# Patient Record
Sex: FEMALE | Race: WHITE
[De-identification: names, ages, dates, MRNs, and addresses within clinical notes are randomized per-mention and may not be internally consistent; named-entity substitution may affect disease eponyms.]

---

## 2018-05-09 ENCOUNTER — HOSPITAL ENCOUNTER (OUTPATIENT)
Dept: HOSPITAL 53 - M ADMPAT | Age: 73
End: 2018-05-09
Attending: ORTHOPAEDIC SURGERY
Payer: COMMERCIAL

## 2018-05-09 DIAGNOSIS — M17.12: Primary | ICD-10-CM

## 2018-05-09 LAB
ALBUMIN/GLOBULIN RATIO: 1.2 (ref 1–1.93)
ALBUMIN: 3.6 GM/DL (ref 3.2–5.2)
ALKALINE PHOSPHATASE: 74 U/L (ref 45–117)
ALT SERPL W P-5'-P-CCNC: 39 U/L (ref 12–78)
ANION GAP: 3 MEQ/L (ref 8–16)
AST SERPL-CCNC: 30 U/L (ref 7–37)
BILIRUBIN,TOTAL: 0.5 MG/DL (ref 0.2–1)
BLOOD UREA NITROGEN: 25 MG/DL (ref 7–18)
CALCIUM LEVEL: 9.6 MG/DL (ref 8.8–10.2)
CARBON DIOXIDE LEVEL: 32 MEQ/L (ref 21–32)
CHLORIDE LEVEL: 105 MEQ/L (ref 98–107)
CREATININE FOR GFR: 0.7 MG/DL (ref 0.55–1.3)
ERYTHROCYTE SEDIMENTATION RATE: 12 MM/HR (ref 0–30)
GFR SERPL CREATININE-BSD FRML MDRD: > 60 ML/MIN/{1.73_M2} (ref 39–?)
GLUCOSE, FASTING: 89 MG/DL (ref 70–100)
HEMATOCRIT: 41.3 % (ref 36–47)
HEMOGLOBIN: 13.8 G/DL (ref 12–15.5)
INR: 0.91
MEAN CORPUSCULAR HEMOGLOBIN: 31.4 PG (ref 27–33)
MEAN CORPUSCULAR HGB CONC: 33.4 G/DL (ref 32–36.5)
MEAN CORPUSCULAR VOLUME: 93.9 FL (ref 80–96)
NRBC BLD AUTO-RTO: 0 % (ref 0–0)
PLATELET COUNT, AUTOMATED: 306 10^3/UL (ref 150–450)
POTASSIUM SERUM: 4.4 MEQ/L (ref 3.5–5.1)
PROTHROMBIN TIME: 12.3 SECONDS (ref 12.4–14.5)
RED BLOOD COUNT: 4.4 10^6/UL (ref 4–5.4)
RED CELL DISTRIBUTION WIDTH: 12.8 % (ref 11.5–14.5)
SODIUM LEVEL: 140 MEQ/L (ref 136–145)
TOTAL PROTEIN: 6.6 GM/DL (ref 6.4–8.2)
WHITE BLOOD COUNT: 8.9 10^3/UL (ref 4–10)

## 2018-05-09 PROCEDURE — 71046 X-RAY EXAM CHEST 2 VIEWS: CPT

## 2018-05-22 ENCOUNTER — HOSPITAL ENCOUNTER (INPATIENT)
Dept: HOSPITAL 53 - M OR | Age: 73
LOS: 2 days | Discharge: HOME HEALTH | DRG: 302 | End: 2018-05-24
Attending: ORTHOPAEDIC SURGERY | Admitting: ORTHOPAEDIC SURGERY
Payer: COMMERCIAL

## 2018-05-22 DIAGNOSIS — Z79.899: ICD-10-CM

## 2018-05-22 DIAGNOSIS — M10.9: ICD-10-CM

## 2018-05-22 DIAGNOSIS — E78.5: ICD-10-CM

## 2018-05-22 DIAGNOSIS — I10: ICD-10-CM

## 2018-05-22 DIAGNOSIS — M17.12: Primary | ICD-10-CM

## 2018-05-22 DIAGNOSIS — Z98.42: ICD-10-CM

## 2018-05-22 DIAGNOSIS — Z79.82: ICD-10-CM

## 2018-05-22 PROCEDURE — 0SRD0J9 REPLACEMENT OF LEFT KNEE JOINT WITH SYNTHETIC SUBSTITUTE, CEMENTED, OPEN APPROACH: ICD-10-PCS

## 2018-05-22 RX ADMIN — DEXTROSE MONOHYDRATE 1 MG: 50 INJECTION, SOLUTION INTRAVENOUS at 08:55

## 2018-05-22 RX ADMIN — MIDAZOLAM 1 MG: 1 INJECTION INTRAMUSCULAR; INTRAVENOUS at 07:29

## 2018-05-22 RX ADMIN — BUPIVACAINE HYDROCHLORIDE AND EPINEPHRINE BITARTRATE 1 ML: 5; .005 INJECTION, SOLUTION EPIDURAL; INTRACAUDAL; PERINEURAL at 08:28

## 2018-05-22 RX ADMIN — DEXTROSE MONOHYDRATE 1 MG: 50 INJECTION, SOLUTION INTRAVENOUS at 08:54

## 2018-05-22 RX ADMIN — CEFAZOLIN SODIUM 1 GM: 1 INJECTION, POWDER, FOR SOLUTION INTRAMUSCULAR; INTRAVENOUS at 08:53

## 2018-05-22 RX ADMIN — SODIUM CHLORIDE, POTASSIUM CHLORIDE, SODIUM LACTATE AND CALCIUM CHLORIDE 1 MLS/HR: 600; 310; 30; 20 INJECTION, SOLUTION INTRAVENOUS at 20:06

## 2018-05-22 RX ADMIN — PREGABALIN 1 MG: 75 CAPSULE ORAL at 06:37

## 2018-05-22 RX ADMIN — MIDAZOLAM 1 MG: 1 INJECTION INTRAMUSCULAR; INTRAVENOUS at 07:27

## 2018-05-22 RX ADMIN — FENTANYL CITRATE 1 MCG: 50 INJECTION, SOLUTION INTRAMUSCULAR; INTRAVENOUS at 07:27

## 2018-05-22 RX ADMIN — SODIUM CHLORIDE, POTASSIUM CHLORIDE, SODIUM LACTATE AND CALCIUM CHLORIDE 1 MLS/HR: 600; 310; 30; 20 INJECTION, SOLUTION INTRAVENOUS at 10:45

## 2018-05-22 RX ADMIN — BUPIVACAINE HYDROCHLORIDE 1 ML: 2.5 INJECTION, SOLUTION EPIDURAL; INFILTRATION; INTRACAUDAL at 09:30

## 2018-05-22 RX ADMIN — BUPIVACAINE 1 ML: 13.3 INJECTION, SUSPENSION, LIPOSOMAL INFILTRATION at 09:30

## 2018-05-22 RX ADMIN — CELECOXIB 1 MG: 400 CAPSULE ORAL at 06:37

## 2018-05-23 LAB
ALBUMIN/GLOBULIN RATIO: 1.13 (ref 1–1.93)
ALBUMIN: 2.6 GM/DL (ref 3.2–5.2)
ALKALINE PHOSPHATASE: 51 U/L (ref 45–117)
ALT SERPL W P-5'-P-CCNC: 32 U/L (ref 12–78)
ANION GAP: 6 MEQ/L (ref 8–16)
AST SERPL-CCNC: 26 U/L (ref 7–37)
BILIRUBIN,TOTAL: 0.5 MG/DL (ref 0.2–1)
BLOOD UREA NITROGEN: 23 MG/DL (ref 7–18)
CALCIUM LEVEL: 8 MG/DL (ref 8.8–10.2)
CARBON DIOXIDE LEVEL: 28 MEQ/L (ref 21–32)
CHLORIDE LEVEL: 108 MEQ/L (ref 98–107)
CREATININE FOR GFR: 0.71 MG/DL (ref 0.55–1.3)
GFR SERPL CREATININE-BSD FRML MDRD: > 60 ML/MIN/{1.73_M2} (ref 39–?)
GLUCOSE, FASTING: 128 MG/DL (ref 70–100)
HEMATOCRIT: 32.5 % (ref 36–47)
HEMOGLOBIN: 10.7 G/DL (ref 12–15.5)
INR: 1.18
MEAN CORPUSCULAR HEMOGLOBIN: 31.1 PG (ref 27–33)
MEAN CORPUSCULAR HGB CONC: 32.9 G/DL (ref 32–36.5)
MEAN CORPUSCULAR VOLUME: 94.5 FL (ref 80–96)
NRBC BLD AUTO-RTO: 0 % (ref 0–0)
PLATELET COUNT, AUTOMATED: 210 10^3/UL (ref 150–450)
POTASSIUM SERUM: 4.4 MEQ/L (ref 3.5–5.1)
PROTHROMBIN TIME: 15.2 SECONDS (ref 12.4–14.5)
RED BLOOD COUNT: 3.44 10^6/UL (ref 4–5.4)
RED CELL DISTRIBUTION WIDTH: 12.8 % (ref 11.5–14.5)
SODIUM LEVEL: 142 MEQ/L (ref 136–145)
TOTAL PROTEIN: 4.9 GM/DL (ref 6.4–8.2)
WHITE BLOOD COUNT: 12.6 10^3/UL (ref 4–10)

## 2018-05-23 RX ADMIN — SODIUM CHLORIDE, POTASSIUM CHLORIDE, SODIUM LACTATE AND CALCIUM CHLORIDE 1 MLS/HR: 600; 310; 30; 20 INJECTION, SOLUTION INTRAVENOUS at 04:44

## 2018-05-23 RX ADMIN — DOCUSATE SODIUM,SENNOSIDES 1 TAB: 50; 8.6 TABLET, FILM COATED ORAL at 09:05

## 2018-05-23 RX ADMIN — LISINOPRIL 1 MG: 10 TABLET ORAL at 09:04

## 2018-05-23 RX ADMIN — MULTIPLE VITAMINS W/ MINERALS TAB 1 TAB: TAB at 09:04

## 2018-05-23 RX ADMIN — CELECOXIB 1 MG: 100 CAPSULE ORAL at 09:04

## 2018-05-23 RX ADMIN — ASPIRIN 1 MG: 81 TABLET ORAL at 09:04

## 2018-05-23 RX ADMIN — DOCUSATE SODIUM,SENNOSIDES 1 TAB: 50; 8.6 TABLET, FILM COATED ORAL at 21:17

## 2018-05-23 RX ADMIN — MAGNESIUM HYDROXIDE 1 ML: 400 SUSPENSION ORAL at 09:04

## 2018-05-23 RX ADMIN — Medication 1 UNITS: at 09:04

## 2018-05-23 RX ADMIN — Medication 1 EA: at 09:05

## 2018-05-23 RX ADMIN — RIVAROXABAN 1 MG: 10 TABLET, FILM COATED ORAL at 17:18

## 2018-05-23 RX ADMIN — POLYETHYLENE GLYCOL 3350 1 PKT: 17 POWDER, FOR SOLUTION ORAL at 09:00

## 2018-05-24 LAB
INR: 1.3
PROTHROMBIN TIME: 16.5 SECONDS (ref 12.4–14.5)

## 2018-05-24 RX ADMIN — MORPHINE SULFATE 1 MG: 15 TABLET, EXTENDED RELEASE ORAL at 11:14

## 2018-05-24 RX ADMIN — ASPIRIN 1 MG: 81 TABLET ORAL at 08:58

## 2018-05-24 RX ADMIN — MULTIPLE VITAMINS W/ MINERALS TAB 1 TAB: TAB at 09:02

## 2018-05-24 RX ADMIN — CELECOXIB 1 MG: 100 CAPSULE ORAL at 08:58

## 2018-05-24 RX ADMIN — Medication 1 UNITS: at 09:01

## 2018-05-24 RX ADMIN — LISINOPRIL 1 MG: 10 TABLET ORAL at 09:01

## 2018-05-24 RX ADMIN — DOCUSATE SODIUM,SENNOSIDES 1 TAB: 50; 8.6 TABLET, FILM COATED ORAL at 09:00

## 2018-05-24 RX ADMIN — MAGNESIUM HYDROXIDE 1 ML: 400 SUSPENSION ORAL at 09:02

## 2018-05-24 RX ADMIN — Medication 1 EA: at 09:00

## 2018-05-24 RX ADMIN — POLYETHYLENE GLYCOL 3350 1 PKT: 17 POWDER, FOR SOLUTION ORAL at 09:02

## 2018-08-08 ENCOUNTER — HOSPITAL ENCOUNTER (OUTPATIENT)
Dept: HOSPITAL 53 - M WHC | Age: 73
End: 2018-08-08
Attending: INTERNAL MEDICINE
Payer: COMMERCIAL

## 2018-08-08 DIAGNOSIS — M81.8: Primary | ICD-10-CM

## 2018-08-08 DIAGNOSIS — Z12.31: ICD-10-CM

## 2018-08-08 PROCEDURE — 77067 SCR MAMMO BI INCL CAD: CPT

## 2018-12-04 ENCOUNTER — HOSPITAL ENCOUNTER (OUTPATIENT)
Dept: HOSPITAL 53 - M LAB | Age: 73
End: 2018-12-04
Attending: INTERNAL MEDICINE
Payer: COMMERCIAL

## 2018-12-04 DIAGNOSIS — M81.0: Primary | ICD-10-CM

## 2018-12-04 LAB — CALCIUM LEVEL: 9.1 MG/DL (ref 8.8–10.2)

## 2018-12-04 PROCEDURE — 82310 ASSAY OF CALCIUM: CPT

## 2019-06-03 ENCOUNTER — HOSPITAL ENCOUNTER (OUTPATIENT)
Dept: HOSPITAL 53 - M LABDRAW1 | Age: 74
End: 2019-06-03
Attending: INTERNAL MEDICINE
Payer: COMMERCIAL

## 2019-06-03 DIAGNOSIS — M81.0: Primary | ICD-10-CM

## 2019-10-25 ENCOUNTER — HOSPITAL ENCOUNTER (OUTPATIENT)
Dept: HOSPITAL 53 - M WHC | Age: 74
End: 2019-10-25
Attending: INTERNAL MEDICINE
Payer: COMMERCIAL

## 2019-10-25 DIAGNOSIS — Z12.31: Primary | ICD-10-CM

## 2019-10-25 NOTE — REPMRS
Patient History

The patient states she had a clinical breast exam in  9/2019.

No known family history of cancer.

 

3D TOMOSYNTHESIS WAS PERFORMED.

 

The [facility] lifetime risk for breast cancer is 4.6%.

 

Digital Woman Screen Mammo: October 25, 2019 - Exam #: 

[id number]

Bilateral CC and MLO view(s) were taken.

 

Technologist: Gisel Ortiz, Technologist

Prior study comparison: August 8, 2018, bilateral digital woman 

screen mammo performed at [facility].  May 

10, 2017, digital woman screen mammo performed at [facility]

[facility].

 

FINDINGS: The breast tissue is heterogeneously dense.  This may 

lower the sensitivity of mammography.  There has been no change 

in the appearance of the mammogram from the prior studies.  There

is a moderate amount of residual fibroglandular tissue which is 

fairly symmetric. There is no interval development of dominant 

mass, areas of architectural distortion, or clustered 

microcalcification typical of malignancy.

 

Assessment: BI-RADS/ACR category 1 mammogram. Negative Mammogram.

 

Recommendation

Routine screening mammogram in 1 year (for women over age 40).

This mammogram was interpreted with the aid of an FDA-approved 

computer-aided dectection system.

 

Electronically Signed By: Blane Landeros MD 10/25/19 1007

## 2019-12-03 ENCOUNTER — HOSPITAL ENCOUNTER (OUTPATIENT)
Dept: HOSPITAL 53 - M LABDRAW1 | Age: 74
End: 2019-12-03
Attending: INTERNAL MEDICINE
Payer: COMMERCIAL

## 2019-12-03 DIAGNOSIS — M81.0: Primary | ICD-10-CM

## 2019-12-03 LAB
25(OH)D3 SERPL-MCNC: 36.8 NG/ML (ref 30–100)
CALCIUM SERPL-MCNC: 10.2 MG/DL (ref 8.8–10.2)

## 2019-12-17 ENCOUNTER — HOSPITAL ENCOUNTER (OUTPATIENT)
Dept: HOSPITAL 53 - M SDC | Age: 74
Discharge: HOME | End: 2019-12-17
Payer: COMMERCIAL

## 2019-12-17 VITALS — WEIGHT: 186 LBS | BODY MASS INDEX: 30.99 KG/M2 | HEIGHT: 65 IN

## 2019-12-17 VITALS — SYSTOLIC BLOOD PRESSURE: 133 MMHG | DIASTOLIC BLOOD PRESSURE: 63 MMHG

## 2019-12-17 DIAGNOSIS — Z79.899: ICD-10-CM
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
## 2019-12-17 DIAGNOSIS — I10: ICD-10-CM
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
## 2019-12-17 DIAGNOSIS — S52.571A: Primary | ICD-10-CM

## 2019-12-17 PROCEDURE — 25608 OPTX DST RD XART FX/EPI SEP2: CPT

## 2019-12-17 PROCEDURE — 76000 FLUOROSCOPY <1 HR PHYS/QHP: CPT

## 2019-12-17 NOTE — REP
22 spot fluoroscopic images:  12/17/2019.

 

Indication:  Intraoperative guidance.

 

Findings:

 

22 spot fluoroscopic images were obtained for intraoperative guidance.  Please

see operative report for details.

 

Impression:

 

Fluoroscopy provided for operative guidance.

 

39 seconds of fluoroscopy time.

 

 

 

 

Electronically Signed by

Anthony Suh DO 12/17/2019 04:07 P

## 2019-12-18 NOTE — RO
DATE OF PROCEDURE:  12/17/2019

 

PREOPERATIVE DIAGNOSIS:  Right comminuted intra-articular distal radius

fracture.

 

POSTOPERATIVE DIAGNOSIS:  Right comminuted intra-articular distal radius

fracture.

 

PROCEDURE:  Right open reduction, internal fixation of distal radius fracture,

modifier 22 from two prior surgeries in the operative plane.

 

SURGEON:  Sam Mabry MD

 

ASSISTANT:  None.

 

ANESTHESIA:  General.

 

INDICATIONS:  74-year-old female who had a fall this morning, suffered

intra-articular distal radius fracture that was distally displaced and

comminuted.  We discussed non versus operative intervention.  Patient elected to

proceed with operative intervention, especially considering she already had 
prior

serratus surgery before provided by Dr. Diez in the past.  She had the

original surgery along with removal of hardware.  She understood the risks and

benefits of surgery including, but not limited to, infection, damage to

surrounding structures, malunion, nonunion, incomplete relief.  Patient

understood these and wished to proceed.

 

PREOPERATIVE ANTIBIOTICS:  2 grams of Ancef.

 

COMPLICATIONS:  None.

 

TOURNIQUET TIME:  36 minutes.

 

BLOOD LOSS:  Minimal.

 

OPERATIVE DESCRIPTION:  Patient was brought back to the operating room (OR) in

supine position, underwent general anesthesia, at which point the right arm was

prepped and draped in the usual fashion.  We did a time-out confirming site,

side, and surgery.  Elevated tourniquet up once all in agreement to 250 mmHg.

 

We then utilized her old incision with a slight extension distally 1 cm along 
the

flexor carpi radialis (FCR) tendon.  We identified the FCR tendon.  There was

significant scar tissue through here surrounding FCR tendon and the radial

artery.  I presumed the previous surgeon had used the traditional Laron approach

between FCR and the radial artery during the two prior surgeries.  Therefore,

careful dissection was taken to ensure that the radial artery along with

superficial sensory branch of radial nerve is not injured, and this why we had

added the additional modifier 22 along with the site malunion that had occurred;

that made the reduction a little more difficult to assess.

 

At this point, the FCR was then retracted ulnarly.  We encountered flexor

pollicis longus (FPL).  This was retracted ulnarly as well, exposing pronator

quadratus.  We lifted the pronator quadratus off the bone, exposing the fracture

site.  This was irrigated and debrided sharply with elevator Shelton and #15

blade.

 

At this point, a closed reduction technique was performed to reduce the 
fracture.

We confirmed on AP and lateral.  We were happy with this reduction.  We then 
used

the three shaft hole variable angle distal radius Synthes plate and secured it

distally with a RayTec underneath the most proximal aspect of the shaft to help

restore the volar tilt.  We confirmed screw placement, four screws distally in

the locking holes on both AP and lateral obliques and inclination in order to

assess reduction along with no screws penetrating the articular surface.  We 
were

happy with this.

 

We then secured the plate to the shaft using 2.7 cortical

screws.  We confirmed on AP and lateral x-rays adequate reduction of the 
fracture

along with fixation.  Irrigated the wound thoroughly.  Closed with #3-0 Vicryl

and #3-0 nylon.  Dressed with Adaptic, gauze, Kerlix, and placed dorsal based

splints for the distal radius.  Tourniquet was deflated, and patient was 
awakened

and taken to the postanesthesia care unit (PACU) in stable condition.

 

POSTOPERATIVE PLAN:  Patient will work on finger range of motion and pain

control.  We will see her in the office in approximately 2 weeks for a repeat

clinical check and suture removal and convert her over to removable brace at 
that

time.  Patient expressed understanding and agreement with this ahead of time.

SYLVIA

## 2020-06-01 ENCOUNTER — HOSPITAL ENCOUNTER (OUTPATIENT)
Dept: HOSPITAL 53 - M LAB | Age: 75
End: 2020-06-01
Attending: INTERNAL MEDICINE
Payer: COMMERCIAL

## 2020-06-01 DIAGNOSIS — M81.0: Primary | ICD-10-CM

## 2020-08-10 ENCOUNTER — HOSPITAL ENCOUNTER (OUTPATIENT)
Dept: HOSPITAL 53 - M WHC | Age: 75
End: 2020-08-10
Attending: INTERNAL MEDICINE
Payer: COMMERCIAL

## 2020-08-10 DIAGNOSIS — M85.851: ICD-10-CM

## 2020-08-10 DIAGNOSIS — Z12.31: Primary | ICD-10-CM

## 2020-08-10 DIAGNOSIS — M81.0: Primary | ICD-10-CM

## 2020-08-27 NOTE — REPMRS
Patient History

The patient states she had a clinical breast exam in June 2020.

Patient is postmenopausal.

No known family history of cancer.

 

Digital Woman Screen Mammo: August 10, 2020 - Exam #: 

[id number]

Bilateral CC and MLO view(s) were taken.

 

Technologist: Radha Johnson, Technologist

Prior study comparison: October 25, 2019, bilateral digital woman

screen mammo performed at [facility].  August 8, 2018, bilateral digital woman screen 

mammo performed at [facility].  May 10, 2017, digital woman screen mammo performed at 

[facility].

 

FINDINGS: There are scattered fibroglandular densities.  The 

Volpara volumetric breast density category is:B.  There has been 

no change in the appearance of the mammogram from the prior 

studies.  There is a mild amount of scattered fibroglandular 

density which is fairly symmetric. There is no interval 

development of dominant mass, architectural distortion, or 

grouped microcalcification suggestive of malignancy.

3-D tomosynthesis shows no additional findings.

 

Assessment: BI-RADS/ACR category 1 mammogram. Negative Mammogram.

 

Recommendation

Routine screening mammogram of both breasts in 1 year (for women 

over age 40).

This patient's Lifetime Breast Cancer Risk is estimated at 4.6 %.

This mammogram was interpreted with the aid of an FDA-approved 

computer-aided dectection system.

 

Electronically Signed By: Flaco Youngblood MD 08/27/20 9747

## 2020-09-01 NOTE — DEXA
AP SPINE   L1 - L4   1.279       0.7           2.3      

LT FEMUR   TOTAL   0.684      -2.6          -1.5               

LT NECK      0.765      -2.0          -0.7               

RT FEMUR   TOTAL   0.687      -2.5          -1.5      

RT NECK      0.808      -1.7          -0.4

TOTAL BODY   TOTAL            

OTHER



COMMENTS:

Normal Densitometry of the spine and hips.

There is low bone density of the right hip.

There is Osteoporosis of the left hip.

Lumbar scoliosis.

The density of the spine has increased 6.3% since the initial exam on 
08/13/2003.

The increased 2.2% since the most recent exam on 06/30/2016.

The density of the left hip has decreased 19.0% since the initial exam on 
08/13/2003.

The density of the left hip has decreased 5.0% since the most recent exam on 
06/30/2016.

The density of the right hip has decreased 17.8% since the initial exam on 
08/13/2003

The density of the right hip has decreased 7.3% since the most recent exam on 
06/30/2016.



FOLLOW-UP:

Recommendation for the next bone density exam: 2 years.


SYLVIA

## 2020-12-11 ENCOUNTER — HOSPITAL ENCOUNTER (OUTPATIENT)
Dept: HOSPITAL 53 - M LAB | Age: 75
End: 2020-12-11
Attending: INTERNAL MEDICINE
Payer: MEDICARE

## 2020-12-11 DIAGNOSIS — M81.0: Primary | ICD-10-CM

## 2020-12-11 DIAGNOSIS — E55.9: ICD-10-CM

## 2020-12-11 LAB
25(OH)D3 SERPL-MCNC: 34.7 NG/ML (ref 30–100)
CALCIUM SERPL-MCNC: 9.9 MG/DL (ref 8.8–10.2)

## 2021-04-18 ENCOUNTER — HOSPITAL ENCOUNTER (EMERGENCY)
Dept: HOSPITAL 53 - M ED | Age: 76
Discharge: ANOTHER HOSPITAL | End: 2021-04-18
Payer: MEDICARE

## 2021-04-18 VITALS — HEIGHT: 65 IN | BODY MASS INDEX: 33.06 KG/M2 | WEIGHT: 198.42 LBS

## 2021-04-18 VITALS — SYSTOLIC BLOOD PRESSURE: 140 MMHG | DIASTOLIC BLOOD PRESSURE: 70 MMHG

## 2021-04-18 VITALS — SYSTOLIC BLOOD PRESSURE: 142 MMHG | DIASTOLIC BLOOD PRESSURE: 74 MMHG

## 2021-04-18 DIAGNOSIS — I10: ICD-10-CM

## 2021-04-18 DIAGNOSIS — E78.5: ICD-10-CM

## 2021-04-18 DIAGNOSIS — I21.19: Primary | ICD-10-CM

## 2021-04-18 DIAGNOSIS — Z79.899: ICD-10-CM

## 2021-04-18 LAB
ALBUMIN SERPL BCG-MCNC: 3.6 GM/DL (ref 3.2–5.2)
ALT SERPL W P-5'-P-CCNC: 48 U/L (ref 12–78)
APTT BLD: 24.8 SECONDS (ref 24.2–38.5)
BASOPHILS # BLD AUTO: 0.1 10^3/UL (ref 0–0.2)
BASOPHILS NFR BLD AUTO: 0.7 % (ref 0–1)
BILIRUB CONJ SERPL-MCNC: 0.1 MG/DL (ref 0–0.2)
BILIRUB SERPL-MCNC: 0.5 MG/DL (ref 0.2–1)
BUN SERPL-MCNC: 30 MG/DL (ref 7–18)
CALCIUM SERPL-MCNC: 9.7 MG/DL (ref 8.8–10.2)
CHLORIDE SERPL-SCNC: 102 MEQ/L (ref 98–107)
CK MB CFR.DF SERPL CALC: 13.47
CK MB SERPL-MCNC: 76.4 NG/ML (ref ?–3.6)
CK SERPL-CCNC: 567 U/L (ref 26–192)
CO2 SERPL-SCNC: 28 MEQ/L (ref 21–32)
CREAT SERPL-MCNC: 0.8 MG/DL (ref 0.55–1.3)
EOSINOPHIL # BLD AUTO: 0.4 10^3/UL (ref 0–0.5)
EOSINOPHIL NFR BLD AUTO: 2.9 % (ref 0–3)
GFR SERPL CREATININE-BSD FRML MDRD: > 60 ML/MIN/{1.73_M2} (ref 39–?)
GLUCOSE SERPL-MCNC: 125 MG/DL (ref 70–100)
HCT VFR BLD AUTO: 42.1 % (ref 36–47)
HGB BLD-MCNC: 14.4 G/DL (ref 12–15.5)
INR PPP: 0.95
LIPASE SERPL-CCNC: 139 U/L (ref 73–393)
LYMPHOCYTES # BLD AUTO: 2.5 10^3/UL (ref 1.5–5)
LYMPHOCYTES NFR BLD AUTO: 18.4 % (ref 24–44)
MCH RBC QN AUTO: 31.8 PG (ref 27–33)
MCHC RBC AUTO-ENTMCNC: 34.2 G/DL (ref 32–36.5)
MCV RBC AUTO: 92.9 FL (ref 80–96)
MONOCYTES # BLD AUTO: 1.1 10^3/UL (ref 0–0.8)
MONOCYTES NFR BLD AUTO: 8.3 % (ref 2–8)
NEUTROPHILS # BLD AUTO: 9.4 10^3/UL (ref 1.5–8.5)
NEUTROPHILS NFR BLD AUTO: 68.8 % (ref 36–66)
PLATELET # BLD AUTO: 387 10^3/UL (ref 150–450)
POTASSIUM SERPL-SCNC: 4.2 MEQ/L (ref 3.5–5.1)
PROT SERPL-MCNC: 7.1 GM/DL (ref 6.4–8.2)
PROTHROMBIN TIME: 12.9 SECONDS (ref 12.5–14.3)
RBC # BLD AUTO: 4.53 10^6/UL (ref 4–5.4)
SODIUM SERPL-SCNC: 138 MEQ/L (ref 136–145)
TROPONIN I SERPL-MCNC: 7.05 NG/ML (ref ?–0.1)
WBC # BLD AUTO: 13.7 10^3/UL (ref 4–10)

## 2021-04-18 PROCEDURE — 85025 COMPLETE CBC W/AUTO DIFF WBC: CPT

## 2021-04-18 PROCEDURE — 71045 X-RAY EXAM CHEST 1 VIEW: CPT

## 2021-04-18 PROCEDURE — 83690 ASSAY OF LIPASE: CPT

## 2021-04-18 PROCEDURE — 84484 ASSAY OF TROPONIN QUANT: CPT

## 2021-04-18 PROCEDURE — 96374 THER/PROPH/DIAG INJ IV PUSH: CPT

## 2021-04-18 PROCEDURE — 93041 RHYTHM ECG TRACING: CPT

## 2021-04-18 PROCEDURE — 80048 BASIC METABOLIC PNL TOTAL CA: CPT

## 2021-04-18 PROCEDURE — 82553 CREATINE MB FRACTION: CPT

## 2021-04-18 PROCEDURE — 85610 PROTHROMBIN TIME: CPT

## 2021-04-18 PROCEDURE — 85730 THROMBOPLASTIN TIME PARTIAL: CPT

## 2021-04-18 PROCEDURE — 96375 TX/PRO/DX INJ NEW DRUG ADDON: CPT

## 2021-04-18 PROCEDURE — 87798 DETECT AGENT NOS DNA AMP: CPT

## 2021-04-18 PROCEDURE — 93005 ELECTROCARDIOGRAM TRACING: CPT

## 2021-04-18 PROCEDURE — 80076 HEPATIC FUNCTION PANEL: CPT

## 2021-04-18 PROCEDURE — 82550 ASSAY OF CK (CPK): CPT

## 2021-04-18 PROCEDURE — 99285 EMERGENCY DEPT VISIT HI MDM: CPT

## 2021-04-18 PROCEDURE — 96361 HYDRATE IV INFUSION ADD-ON: CPT

## 2021-04-18 PROCEDURE — 94760 N-INVAS EAR/PLS OXIMETRY 1: CPT

## 2021-04-18 RX ADMIN — NITROGLYCERIN PRN MG: 0.4 TABLET SUBLINGUAL at 03:01

## 2021-04-18 RX ADMIN — NITROGLYCERIN PRN MG: 0.4 TABLET SUBLINGUAL at 02:45

## 2021-04-18 RX ADMIN — NITROGLYCERIN PRN MG: 0.4 TABLET SUBLINGUAL at 02:53

## 2021-04-18 NOTE — ECGEPIP
[facility] - ED

                                       

                                       Test Date:    2021

Pat Name:     IVANNA MCGOWAN         Department:   

Patient ID:   [id number]                 Room:         -

Gender:       Female                   Technician:   BARON WATTERS

:          1945               Requested By: KRISTI KEEN

Order Number: [id number]     Reading MD:   Ludin Morgan

                                 Measurements

Intervals                              Axis          

Rate:         83                       P:            34

AK:           182                      QRS:          44

QRSD:         138                      T:            -32

QT:           436                                    

QTc:          512                                    

                           Interpretive Statements

Normal sinus rhythm

Right bundle branch block

Inferior infarct , possibly acute

** ** ACUTE MI / STEMI ** **

Electronically Signed on 2021 5:06:05 EDT by Ludin Morgan

## 2021-04-18 NOTE — REPVR
PROCEDURE INFORMATION: 



Exam: XR Chest 

Exam date and time:  4/18/21  (2:32am)

Age: 75 years old 

Clinical indication:  Chest pain 



TECHNIQUE: 

Imaging protocol:  Portable CXR

Views:  1 view 



COMPARISON: 

Chest films of 5/09/18 



FINDINGS: 

Lungs: Unremarkable. No consolidation. 

Pleural spaces:  No pleural effusions. No pneumothorax.  Mild elevation of the 

right hemidiaphragm again seen.  

Heart/Mediastinum:  The heart may be mildly enlarged.   

Bones/joints:  Levoscoliosis of the lower thoracic spine.  S-type scoliosis of 

the thoraco-lumbar spine.



IMPRESSION: 

No acute findings. 



Electronically signed by: Estephania Montana On 04/18/2021  03:18:28 AM

## 2021-05-25 ENCOUNTER — HOSPITAL ENCOUNTER (OUTPATIENT)
Dept: HOSPITAL 53 - M SLEEP HO | Age: 76
End: 2021-05-25
Attending: INTERNAL MEDICINE
Payer: MEDICARE

## 2021-05-25 DIAGNOSIS — I27.20: Primary | ICD-10-CM

## 2021-05-27 NOTE — SLEEPHOME
DIAGNOSTIC HOME SLEEP STUDY 



DATE:  05/25/2021



ORDERED BY: Que Heck M.D.  



Diagnostic home sleep testing was performed due to concern for the obstructive

sleep apnea syndrome in this patient with a history of pulmonary hypertension. 



For testing, a nocturnal T3 respiratory monitoring device was used. Continuous

record was made of pulse, oxygen saturation, air flow, chest and abdominal

strain, and body position.



9 hours and 59 minutes of data were reviewed. There were 7 hours and 7 minutes

marked as time in bed. During the interval marked time in bed, there were 261

respiratory events identified of 10 seconds in duration or greater for a

respiratory event index 36.6. The events were primarily obstructive, 35 mixed

and central apneas were seen. Pulse rate and saturation measures are unable to

be reported as the probe became dislodged early in the study. Testing was

performed in both the supine and non-supine positions. 



IMPRESSION: 

Abnormal home sleep testing, with repetitive respiratory events and respiratory

event index of 36.6 per hour, is consistent with the obstructive sleep apnea

syndrome. 



RECOMMENDATION:

The patient should be encouraged to undergo formal sleep evaluation.

## 2021-06-07 ENCOUNTER — HOSPITAL ENCOUNTER (OUTPATIENT)
Dept: HOSPITAL 53 - M LAB | Age: 76
End: 2021-06-07
Attending: INTERNAL MEDICINE
Payer: MEDICARE

## 2021-06-07 DIAGNOSIS — E55.9: ICD-10-CM

## 2021-06-07 DIAGNOSIS — M81.0: Primary | ICD-10-CM

## 2021-06-07 LAB
25(OH)D3 SERPL-MCNC: 56.4 NG/ML (ref 30–100)
CALCIUM SERPL-MCNC: 9.7 MG/DL (ref 8.8–10.2)

## 2021-10-14 ENCOUNTER — HOSPITAL ENCOUNTER (OUTPATIENT)
Dept: HOSPITAL 53 - M SLEEP | Age: 76
End: 2021-10-14
Attending: NURSE PRACTITIONER
Payer: MEDICARE

## 2021-10-14 DIAGNOSIS — G47.33: Primary | ICD-10-CM

## 2021-10-18 NOTE — SLEEPCENT
DATE: 10/14/2021



ORDERED BY: FARZANA Mendoza 



Nocturnal polysomnography was performed for evaluation of sleep physiology in

this patient with a history of excessive somnolence and non-restorative sleep.



Six hours and eight minutes of data were reviewed. There were 133.5 minutes of

sleep identified. Sleep latency was normal at 20 minutes, REM sleep was

somewhat delayed at 142 minutes. Sleep architecture showed fragmentation. There

was one REM cycle and long periods of wake late in the study resulting in

reduced sleep efficiency at 37.2%. The electrocardiogram showed what appeared

to be sinus rhythm with occasional ectopy. Average heart rate was 60 beats per

minute. EEG showed normal waveforms for wake and sleep. There were 79

respiratory events identified of 10 seconds in duration or greater for an apnea

hypopnea index of 35.5. The events were more frequently central and mixed than

obstructive, 50 mixed and central apneas to 29 obstructive events. The events

were not exclusive to sleep stage nor position. Arousals from respiratory

events and arousals from snoring were included, occurred 30.1 times per hour

and oxygen desaturations were not apparent. Some limb activity was seen in the

EMG lead, particularly early in the study. Limb movement arousal index was only

6.3.



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 Apnea hypopnea

index 35.5.



RECOMMENDATION: The patient should be encouraged to return to the sleep

disorder center for pressure therapy. Given the frequency of central apneas,

bilevel device and backup rate may be necessary. In the interim, alcohol and

sedative avoidance should be practiced and caution exercised during the

operation of motor vehicles.





cc: RAY LOCKE MD

## 2021-11-15 ENCOUNTER — HOSPITAL ENCOUNTER (OUTPATIENT)
Dept: HOSPITAL 53 - M WHC | Age: 76
End: 2021-11-15
Attending: INTERNAL MEDICINE
Payer: MEDICARE

## 2021-11-15 DIAGNOSIS — Z12.31: Primary | ICD-10-CM

## 2021-11-15 NOTE — REPMRS
Patient History

The patient states she had a clinical breast exam in  October 2021.

No known family history of cancer.

Patient states no breast complaints today. Patient has signed MRS

History Sheet.

 

Digital Woman Screen Mammo: November 15, 2021 - Exam #: 

[id number]

Bilateral CC and MLO view(s) were taken.

 

Technologist: Kathy Collado, Technologist

Prior study comparison: August 10, 2020, [facility]  October 25, 2019, bilateral [facility]

 

FINDINGS: There are scattered fibroglandular densities.  

Screening.

 

Digital screening (2D) mammography was performed bilaterally in 

the CC and MLO projections. Additionally, breast tomosynthesis 

(3D mammography) was performed bilaterally in the CC and MLO 

projections. Todays exam was compared to the prior exam/exams.

 

By history, the patient has no complaints of a palpable breast 

abnormality or other significant breast complaints.

 

The Volpara volumetric breast density category is B, there are 

scattered areas of fibroglandular densities.

 

The breasts are unchanged in size and shape. There are no 

roxane-soft tissue densities or spiculated masses. There is no 

internal architectural distortion. There are no suspicious 

roxane-calcific clusters. Skin thickening or nipple retraction is 

not present.

 

IMPRESSION:

 

BI-RADS Category 2- Benign Findings. There is no evidence of 

malignant alteration of the breasts. Followup examination 

recommended in one year.

 

This mammogram was read with the assistance of iCAD PowerLook 

AMP,an FDA approved computer aided detection system for 

mammography.

 

The lifetime Tyrer-Cuzick score is   3.9%

 

Negative x-ray reports should not delay surgical consultation if 

a dominant or clinically suspicious mass is present.

 

Not all breast cancers can be identified by mammography. 

Therefore, we recommend that you continue to perform regular 

breast self-examination and physical examination and then 

promptly contact your physician of any concerns or changes.

 

Adenosis and dense breasts may obscure an underlying neoplasm.

No significant changes when compared with prior studies.

 

Assessment: BI-RADS/ACR category 2 mammogram. Benign Findings.

 

Recommendation

Routine screening mammogram of both breasts in 1 year.

 

Electronically Signed By: Sav Reddy MD 11/15/21 [id number]

## 2021-12-03 ENCOUNTER — HOSPITAL ENCOUNTER (OUTPATIENT)
Dept: HOSPITAL 53 - M PLALAB | Age: 76
End: 2021-12-03
Attending: INTERNAL MEDICINE
Payer: MEDICARE

## 2021-12-03 DIAGNOSIS — M81.0: Primary | ICD-10-CM

## 2022-01-04 ENCOUNTER — HOSPITAL ENCOUNTER (OUTPATIENT)
Dept: HOSPITAL 53 - M SLEEP | Age: 77
End: 2022-01-04
Attending: NURSE PRACTITIONER
Payer: MEDICARE

## 2022-01-04 DIAGNOSIS — G47.33: Primary | ICD-10-CM

## 2022-02-17 ENCOUNTER — HOSPITAL ENCOUNTER (OUTPATIENT)
Dept: HOSPITAL 53 - M PLALAB | Age: 77
End: 2022-02-17
Attending: INTERNAL MEDICINE
Payer: MEDICARE

## 2022-02-17 DIAGNOSIS — I34.0: ICD-10-CM

## 2022-02-17 DIAGNOSIS — I50.42: Primary | ICD-10-CM

## 2022-02-17 DIAGNOSIS — I25.10: ICD-10-CM

## 2022-02-17 DIAGNOSIS — I11.0: ICD-10-CM

## 2022-02-17 LAB
ALBUMIN SERPL BCG-MCNC: 3.7 GM/DL (ref 3.2–5.2)
ALT SERPL W P-5'-P-CCNC: 33 U/L (ref 12–78)
BASOPHILS # BLD AUTO: 0.1 10^3/UL (ref 0–0.2)
BASOPHILS NFR BLD AUTO: 1.2 % (ref 0–1)
BILIRUB SERPL-MCNC: 0.4 MG/DL (ref 0.2–1)
BUN SERPL-MCNC: 24 MG/DL (ref 7–18)
CALCIUM SERPL-MCNC: 10.1 MG/DL (ref 8.8–10.2)
CHLORIDE SERPL-SCNC: 104 MEQ/L (ref 98–107)
CO2 SERPL-SCNC: 27 MEQ/L (ref 21–32)
CREAT SERPL-MCNC: 0.86 MG/DL (ref 0.55–1.3)
EOSINOPHIL # BLD AUTO: 0.3 10^3/UL (ref 0–0.5)
EOSINOPHIL NFR BLD AUTO: 3.4 % (ref 0–3)
GFR SERPL CREATININE-BSD FRML MDRD: > 60 ML/MIN/{1.73_M2} (ref 39–?)
GLUCOSE SERPL-MCNC: 81 MG/DL (ref 70–100)
HCT VFR BLD AUTO: 42 % (ref 36–47)
HGB BLD-MCNC: 13.4 G/DL (ref 12–15.5)
LYMPHOCYTES # BLD AUTO: 2.2 10^3/UL (ref 1.5–5)
LYMPHOCYTES NFR BLD AUTO: 21.7 % (ref 24–44)
MCH RBC QN AUTO: 32 PG (ref 27–33)
MCHC RBC AUTO-ENTMCNC: 31.9 G/DL (ref 32–36.5)
MCV RBC AUTO: 100.2 FL (ref 80–96)
MONOCYTES # BLD AUTO: 0.7 10^3/UL (ref 0–0.8)
MONOCYTES NFR BLD AUTO: 7.4 % (ref 2–8)
NEUTROPHILS # BLD AUTO: 6.5 10^3/UL (ref 1.5–8.5)
NEUTROPHILS NFR BLD AUTO: 65.3 % (ref 36–66)
NT-PRO BNP: 565 PG/ML (ref ?–450)
PLATELET # BLD AUTO: 296 10^3/UL (ref 150–450)
POTASSIUM SERPL-SCNC: 4.5 MEQ/L (ref 3.5–5.1)
PROT SERPL-MCNC: 6.4 GM/DL (ref 6.4–8.2)
RBC # BLD AUTO: 4.19 10^6/UL (ref 4–5.4)
SODIUM SERPL-SCNC: 140 MEQ/L (ref 136–145)
WBC # BLD AUTO: 9.9 10^3/UL (ref 4–10)

## 2022-06-09 ENCOUNTER — HOSPITAL ENCOUNTER (OUTPATIENT)
Dept: HOSPITAL 53 - M PLALAB | Age: 77
End: 2022-06-09
Attending: INTERNAL MEDICINE
Payer: MEDICARE

## 2022-06-09 DIAGNOSIS — M81.0: Primary | ICD-10-CM

## 2022-06-09 DIAGNOSIS — E55.9: ICD-10-CM

## 2022-06-09 LAB
25(OH)D3 SERPL-MCNC: 51.6 NG/ML (ref 30–100)
CALCIUM SERPL-MCNC: 10.2 MG/DL (ref 8.8–10.2)

## 2022-08-24 ENCOUNTER — HOSPITAL ENCOUNTER (OUTPATIENT)
Dept: HOSPITAL 53 - M LAB REF | Age: 77
End: 2022-08-24
Attending: INTERNAL MEDICINE
Payer: MEDICARE

## 2022-08-24 DIAGNOSIS — I50.42: Primary | ICD-10-CM

## 2022-08-25 LAB
NT-PRO BNP: 287 PG/ML (ref ?–450)
PHOSPHATE SERPL-MCNC: 3.6 MG/DL (ref 2.5–4.9)

## 2022-10-31 ENCOUNTER — HOSPITAL ENCOUNTER (OUTPATIENT)
Dept: HOSPITAL 53 - M WHC | Age: 77
End: 2022-10-31
Attending: INTERNAL MEDICINE
Payer: MEDICARE

## 2022-10-31 DIAGNOSIS — Z12.31: ICD-10-CM

## 2022-10-31 DIAGNOSIS — Z53.9: ICD-10-CM

## 2022-10-31 DIAGNOSIS — Z13.820: Primary | ICD-10-CM

## 2022-10-31 DIAGNOSIS — M81.0: ICD-10-CM

## 2022-11-16 ENCOUNTER — HOSPITAL ENCOUNTER (OUTPATIENT)
Dept: HOSPITAL 53 - M WHC | Age: 77
End: 2022-11-16
Attending: INTERNAL MEDICINE
Payer: MEDICARE

## 2022-11-16 DIAGNOSIS — Z12.31: Primary | ICD-10-CM

## 2022-12-21 ENCOUNTER — HOSPITAL ENCOUNTER (OUTPATIENT)
Dept: HOSPITAL 53 - M PLALAB | Age: 77
End: 2022-12-21
Attending: INTERNAL MEDICINE
Payer: MEDICARE

## 2022-12-21 DIAGNOSIS — M81.0: Primary | ICD-10-CM

## 2023-06-26 ENCOUNTER — HOSPITAL ENCOUNTER (OUTPATIENT)
Dept: HOSPITAL 53 - M PLALAB | Age: 78
End: 2023-06-26
Attending: INTERNAL MEDICINE
Payer: MEDICARE

## 2023-06-26 DIAGNOSIS — E55.9: ICD-10-CM

## 2023-06-26 DIAGNOSIS — M81.0: Primary | ICD-10-CM

## 2023-06-26 LAB
25(OH)D3 SERPL-MCNC: 38.6 NG/ML (ref 20–100)
CALCIUM SERPL-MCNC: 9.1 MG/DL (ref 8.3–10.6)

## 2023-11-13 ENCOUNTER — HOSPITAL ENCOUNTER (OUTPATIENT)
Dept: HOSPITAL 53 - M WHC | Age: 78
End: 2023-11-13
Attending: INTERNAL MEDICINE
Payer: MEDICARE

## 2023-11-13 DIAGNOSIS — Z53.9: ICD-10-CM

## 2023-11-13 DIAGNOSIS — Z13.820: Primary | ICD-10-CM

## 2023-11-13 DIAGNOSIS — Z12.31: Primary | ICD-10-CM

## 2023-12-08 ENCOUNTER — HOSPITAL ENCOUNTER (OUTPATIENT)
Dept: HOSPITAL 53 - M WHC | Age: 78
End: 2023-12-08
Attending: NURSE PRACTITIONER
Payer: MEDICARE

## 2023-12-08 DIAGNOSIS — M81.0: Primary | ICD-10-CM

## 2023-12-29 ENCOUNTER — HOSPITAL ENCOUNTER (OUTPATIENT)
Dept: HOSPITAL 53 - M PLALAB | Age: 78
End: 2023-12-29
Attending: NURSE PRACTITIONER
Payer: MEDICARE

## 2023-12-29 DIAGNOSIS — M81.0: Primary | ICD-10-CM

## 2024-04-04 ENCOUNTER — HOSPITAL ENCOUNTER (OUTPATIENT)
Dept: HOSPITAL 53 - M PLAIMG | Age: 79
End: 2024-04-04
Attending: PHYSICIAN ASSISTANT
Payer: MEDICARE

## 2024-04-04 DIAGNOSIS — I08.0: Primary | ICD-10-CM

## 2024-07-03 ENCOUNTER — HOSPITAL ENCOUNTER (OUTPATIENT)
Dept: HOSPITAL 53 - M PLALAB | Age: 79
End: 2024-07-03
Attending: INTERNAL MEDICINE
Payer: MEDICARE

## 2024-07-03 DIAGNOSIS — M81.0: ICD-10-CM

## 2024-07-03 DIAGNOSIS — E55.9: Primary | ICD-10-CM

## 2024-07-03 LAB
25(OH)D3 SERPL-MCNC: 40.3 NG/ML (ref 20–100)
CALCIUM SERPL-MCNC: 10.3 MG/DL (ref 8.3–10.6)

## 2025-01-09 ENCOUNTER — HOSPITAL ENCOUNTER (OUTPATIENT)
Dept: HOSPITAL 53 - M PLALAB | Age: 80
End: 2025-01-09
Attending: NURSE PRACTITIONER
Payer: MEDICARE

## 2025-01-09 DIAGNOSIS — M81.0: Primary | ICD-10-CM
